# Patient Record
Sex: FEMALE | Race: WHITE | NOT HISPANIC OR LATINO | Employment: UNEMPLOYED | URBAN - METROPOLITAN AREA
[De-identification: names, ages, dates, MRNs, and addresses within clinical notes are randomized per-mention and may not be internally consistent; named-entity substitution may affect disease eponyms.]

---

## 2023-05-30 ENCOUNTER — HOSPITAL ENCOUNTER (EMERGENCY)
Facility: HOSPITAL | Age: 43
Discharge: OTHER INSTITUTION | End: 2023-05-31
Attending: EMERGENCY MEDICINE
Payer: COMMERCIAL

## 2023-05-30 DIAGNOSIS — R11.0 NAUSEA: ICD-10-CM

## 2023-05-30 DIAGNOSIS — R45.851 SUICIDAL IDEATION: Primary | ICD-10-CM

## 2023-05-30 DIAGNOSIS — F11.23 OPIOID DEPENDENCE WITH WITHDRAWAL: ICD-10-CM

## 2023-05-30 LAB
ALBUMIN SERPL-MCNC: 4.7 G/DL (ref 3.5–5.2)
ALBUMIN/GLOB SERPL: 2.4 G/DL
ALP SERPL-CCNC: 74 U/L (ref 39–117)
ALT SERPL W P-5'-P-CCNC: 57 U/L (ref 1–33)
ANION GAP SERPL CALCULATED.3IONS-SCNC: 9.2 MMOL/L (ref 5–15)
AST SERPL-CCNC: 32 U/L (ref 1–32)
BASOPHILS # BLD AUTO: 0.06 10*3/MM3 (ref 0–0.2)
BASOPHILS NFR BLD AUTO: 1 % (ref 0–1.5)
BILIRUB SERPL-MCNC: 0.6 MG/DL (ref 0–1.2)
BUN SERPL-MCNC: 15 MG/DL (ref 6–20)
BUN/CREAT SERPL: 18.3 (ref 7–25)
CALCIUM SPEC-SCNC: 9.7 MG/DL (ref 8.6–10.5)
CHLORIDE SERPL-SCNC: 102 MMOL/L (ref 98–107)
CO2 SERPL-SCNC: 27.8 MMOL/L (ref 22–29)
CREAT SERPL-MCNC: 0.82 MG/DL (ref 0.57–1)
DEPRECATED RDW RBC AUTO: 37.1 FL (ref 37–54)
EGFRCR SERPLBLD CKD-EPI 2021: 92.9 ML/MIN/1.73
EOSINOPHIL # BLD AUTO: 0.07 10*3/MM3 (ref 0–0.4)
EOSINOPHIL NFR BLD AUTO: 1.2 % (ref 0.3–6.2)
ERYTHROCYTE [DISTWIDTH] IN BLOOD BY AUTOMATED COUNT: 12.2 % (ref 12.3–15.4)
ETHANOL BLD-MCNC: <10 MG/DL (ref 0–10)
ETHANOL UR QL: <0.01 %
GLOBULIN UR ELPH-MCNC: 2 GM/DL
GLUCOSE SERPL-MCNC: 87 MG/DL (ref 65–99)
HCG SERPL QL: NEGATIVE
HCT VFR BLD AUTO: 39.4 % (ref 34–46.6)
HGB BLD-MCNC: 13.5 G/DL (ref 12–15.9)
IMM GRANULOCYTES # BLD AUTO: 0.02 10*3/MM3 (ref 0–0.05)
IMM GRANULOCYTES NFR BLD AUTO: 0.3 % (ref 0–0.5)
LYMPHOCYTES # BLD AUTO: 1.52 10*3/MM3 (ref 0.7–3.1)
LYMPHOCYTES NFR BLD AUTO: 26 % (ref 19.6–45.3)
MCH RBC QN AUTO: 28.5 PG (ref 26.6–33)
MCHC RBC AUTO-ENTMCNC: 34.3 G/DL (ref 31.5–35.7)
MCV RBC AUTO: 83.3 FL (ref 79–97)
MONOCYTES # BLD AUTO: 0.3 10*3/MM3 (ref 0.1–0.9)
MONOCYTES NFR BLD AUTO: 5.1 % (ref 5–12)
NEUTROPHILS NFR BLD AUTO: 3.88 10*3/MM3 (ref 1.7–7)
NEUTROPHILS NFR BLD AUTO: 66.4 % (ref 42.7–76)
NRBC BLD AUTO-RTO: 0 /100 WBC (ref 0–0.2)
PLATELET # BLD AUTO: 202 10*3/MM3 (ref 140–450)
PMV BLD AUTO: 10.5 FL (ref 6–12)
POTASSIUM SERPL-SCNC: 4.4 MMOL/L (ref 3.5–5.2)
PROT SERPL-MCNC: 6.7 G/DL (ref 6–8.5)
RBC # BLD AUTO: 4.73 10*6/MM3 (ref 3.77–5.28)
SODIUM SERPL-SCNC: 139 MMOL/L (ref 136–145)
WBC NRBC COR # BLD: 5.85 10*3/MM3 (ref 3.4–10.8)

## 2023-05-30 PROCEDURE — 80307 DRUG TEST PRSMV CHEM ANLYZR: CPT | Performed by: EMERGENCY MEDICINE

## 2023-05-30 PROCEDURE — 80053 COMPREHEN METABOLIC PANEL: CPT | Performed by: EMERGENCY MEDICINE

## 2023-05-30 PROCEDURE — 84703 CHORIONIC GONADOTROPIN ASSAY: CPT | Performed by: EMERGENCY MEDICINE

## 2023-05-30 PROCEDURE — 99284 EMERGENCY DEPT VISIT MOD MDM: CPT

## 2023-05-30 PROCEDURE — 82077 ASSAY SPEC XCP UR&BREATH IA: CPT | Performed by: EMERGENCY MEDICINE

## 2023-05-30 PROCEDURE — 63710000001 ONDANSETRON ODT 4 MG TABLET DISPERSIBLE: Performed by: EMERGENCY MEDICINE

## 2023-05-30 PROCEDURE — 85025 COMPLETE CBC W/AUTO DIFF WBC: CPT | Performed by: EMERGENCY MEDICINE

## 2023-05-30 PROCEDURE — 36415 COLL VENOUS BLD VENIPUNCTURE: CPT

## 2023-05-30 RX ORDER — ONDANSETRON 4 MG/1
4 TABLET, ORALLY DISINTEGRATING ORAL ONCE
Status: COMPLETED | OUTPATIENT
Start: 2023-05-30 | End: 2023-05-30

## 2023-05-30 RX ORDER — CLONIDINE HYDROCHLORIDE 0.1 MG/1
0.1 TABLET ORAL ONCE
Status: COMPLETED | OUTPATIENT
Start: 2023-05-30 | End: 2023-05-30

## 2023-05-30 RX ADMIN — ONDANSETRON 4 MG: 4 TABLET, ORALLY DISINTEGRATING ORAL at 21:25

## 2023-05-30 RX ADMIN — CLONIDINE HYDROCHLORIDE 0.1 MG: 0.1 TABLET ORAL at 21:24

## 2023-05-30 NOTE — ED TRIAGE NOTES
Pt states that she is detoxing from opiates. Reports that she last used 24 hours ago. States that she feels dehydrated and light headed.

## 2023-05-31 VITALS
HEIGHT: 64 IN | WEIGHT: 150 LBS | OXYGEN SATURATION: 92 % | DIASTOLIC BLOOD PRESSURE: 66 MMHG | HEART RATE: 75 BPM | TEMPERATURE: 97.8 F | RESPIRATION RATE: 18 BRPM | SYSTOLIC BLOOD PRESSURE: 105 MMHG | BODY MASS INDEX: 25.61 KG/M2

## 2023-05-31 LAB
AMPHET+METHAMPHET UR QL: POSITIVE
BARBITURATES UR QL SCN: NEGATIVE
BENZODIAZ UR QL SCN: NEGATIVE
CANNABINOIDS SERPL QL: NEGATIVE
COCAINE UR QL: NEGATIVE
FENTANYL UR-MCNC: NEGATIVE NG/ML
METHADONE UR QL SCN: NEGATIVE
OPIATES UR QL: NEGATIVE
OXYCODONE UR QL SCN: NEGATIVE
SARS-COV-2 RNA RESP QL NAA+PROBE: NOT DETECTED

## 2023-05-31 PROCEDURE — 25010000002 DICYCLOMINE PER 20 MG: Performed by: PHYSICIAN ASSISTANT

## 2023-05-31 PROCEDURE — 96372 THER/PROPH/DIAG INJ SC/IM: CPT

## 2023-05-31 PROCEDURE — 87635 SARS-COV-2 COVID-19 AMP PRB: CPT | Performed by: PHYSICIAN ASSISTANT

## 2023-05-31 PROCEDURE — 90791 PSYCH DIAGNOSTIC EVALUATION: CPT

## 2023-05-31 RX ORDER — ONDANSETRON 4 MG/1
4 TABLET, ORALLY DISINTEGRATING ORAL 4 TIMES DAILY PRN
Qty: 12 TABLET | Refills: 0 | Status: SHIPPED | OUTPATIENT
Start: 2023-05-31

## 2023-05-31 RX ORDER — DICYCLOMINE HYDROCHLORIDE 10 MG/ML
20 INJECTION INTRAMUSCULAR ONCE
Status: COMPLETED | OUTPATIENT
Start: 2023-05-31 | End: 2023-05-31

## 2023-05-31 RX ADMIN — DICYCLOMINE HYDROCHLORIDE 20 MG: 20 INJECTION INTRAMUSCULAR at 02:14

## 2023-05-31 NOTE — ED PROVIDER NOTES
0009: Care assumed from Dr. Chamorro pending [facility] evaluation.    0135: [facility] has seen and evaluated the patient.  They are planning to transfer her to an outlying facility.  COVID test ordered.    0150: Recheck of patient.  She is resting quietly.  She understands plan for transfer.    0530: Patient will be transferred to [facility] at 9:00.  EMTALA form filled out.     Jorge Moyer PA  05/31/23 0546

## 2023-05-31 NOTE — CASE MANAGEMENT/SOCIAL WORK
Called [facility] and spoke with Nataly. They also have beds so I have faxed a patient packet to them as well.

## 2023-05-31 NOTE — ED PROVIDER NOTES
EMERGENCY DEPARTMENT ENCOUNTER    Room Number:  09/09  Date of encounter:  5/31/2023  PCP: Darinel Spicer APRN  Patient Care Team:  Darinel Spicer APRN as PCP - General (Nurse Practitioner)   Independent Historians: Patient    HPI:  Chief Complaint: Opiate withdrawal    A complete HPI/ROS/PMH/PSH/SH/FH are unobtainable due to: Nothing    Chronic or social conditions impacting patient care (Social Determinants of Health): None  (Financial Resource Strain / Food Insecurity / Transportation Needs / Physical Activity / Stress / Social Connections / Intimate Partner Violence / Housing Stability)    Context: Marleni rGegg is a 42 y.o. female who presents to the ED c/o acute opiate withdrawal.  She reports her last use of opiates was 24 hours ago.  She reports that she snorts opiates.  She states she does not inject.  She states that she got tired of living that way and decided 24 hours ago to stop using opiates.  She states she has been going through withdrawal.  She states she has nausea, pain all over, headache, vomiting.  She has not had any medicine to help with her symptoms.  She reports she is beginning to feel suicidal.  She denies any attempt to harm herself.  She states that in the past she has attempted to harm herself with prior suicidal feelings.  Nothing makes her symptoms worse or better.  She does states she has a stable living environment.  She denies abdominal pain.  She denies diarrhea.  She denies fevers.    Review of prior external notes (non-ED) -and- Review of prior external test results outside of this encounter: Chest x-ray dated 11/19/2022 was clear    Prescription drug monitoring program review:         PAST MEDICAL HISTORY  Active Ambulatory Problems     Diagnosis Date Noted   • No Active Ambulatory Problems     Resolved Ambulatory Problems     Diagnosis Date Noted   • No Resolved Ambulatory Problems     No Additional Past Medical History         PAST SURGICAL HISTORY  History reviewed.  No pertinent surgical history.      FAMILY HISTORY  History reviewed. No pertinent family history.      SOCIAL HISTORY  Social History     Socioeconomic History   • Marital status:          ALLERGIES  Patient has no known allergies.        REVIEW OF SYSTEMS  Review of Systems  Included in HPI  All systems reviewed and negative except for those discussed in HPI.      PHYSICAL EXAM    I have reviewed the triage vital signs and nursing notes.    ED Triage Vitals   Temp Heart Rate Resp BP SpO2   05/30/23 1901 05/30/23 1901 05/30/23 1901 05/30/23 1902 05/30/23 1901   98.9 °F (37.2 °C) 100 18 136/94 100 %      Temp src Heart Rate Source Patient Position BP Location FiO2 (%)   05/30/23 1901 05/30/23 1901 05/30/23 1902 -- --   Tympanic Monitor Standing         Physical Exam  GENERAL: Awake, alert, no acute distress  SKIN: Warm, dry  HENT: Normocephalic, atraumatic  EYES: no scleral icterus  CV: regular rhythm, regular rate  RESPIRATORY: normal effort, lungs clear  ABDOMEN: soft, nontender, nondistended  MUSCULOSKELETAL: no deformity  NEURO: alert, moves all extremities, follows commands                                                               LAB RESULTS  Recent Results (from the past 24 hour(s))   COVID-19,[facility] IN-HOUSE CEPHEID/HUMAIRA NP SWAB IN TRANSPORT MEDIA 8-12 HR TAT - Swab, Nasopharynx    Collection Time: 05/31/23  1:53 AM    Specimen: Nasopharynx; Swab   Result Value Ref Range    COVID19 Not Detected Not Detected - Ref. Range       Ordered the above labs and independently reviewed the results.        RADIOLOGY  No Radiology Exams Resulted Within Past 24 Hours    I ordered the above noted radiological studies. Reviewed by me and discussed with radiologist.  See dictation for official radiology interpretation.      PROCEDURES    Procedures      MEDICATIONS GIVEN IN ER    Medications   ondansetron ODT (ZOFRAN-ODT) disintegrating tablet 4 mg (4 mg Oral Given 5/30/23 2125)   cloNIDine (CATAPRES) tablet 0.1  mg (0.1 mg Oral Given 5/30/23 2124)   dicyclomine (BENTYL) injection 20 mg (20 mg Intramuscular Given 5/31/23 0214)         ORDERS PLACED DURING THIS VISIT:  Orders Placed This Encounter   Procedures   • COVID PRE-OP / PRE-PROCEDURE SCREENING ORDER (NO ISOLATION) - Swab, Nasopharynx   • COVID-19,[facility] IN-HOUSE CEPHEID/HUMAIRA NP SWAB IN TRANSPORT MEDIA 8-12 HR TAT - Swab, Nasopharynx   • Comprehensive Metabolic Panel   • hCG, Serum, Qualitative   • CBC Auto Differential   • Urine Drug Screen - Urine, Clean Catch   • Ethanol   • Psych / [facility]   • CBC & Differential         PROGRESS, DATA ANALYSIS, CONSULTS, AND MEDICAL DECISION MAKING    All labs have been independently interpreted by me.  All radiology studies have been reviewed by me and discussed with radiologist dictating the report.   EKG's independently viewed and interpreted by me.  Discussion below represents my analysis of pertinent findings related to patient's condition, differential diagnosis, treatment plan and final disposition.    Differential diagnosis includes but is not limited to withdrawal, substance use, intoxication, dehydration.    ED Course as of 05/31/23 2331   Wed May 31, 2023   0006 Care transferred to Jorge Moyer PA-C pending psychiatry evaluation, reevaluation and disposition. [TR]   0556 Patient to be transferred to [facility] at 9 AM. [EE]      ED Course User Index  [EE] Jorge Moyer PA  [TR] Lj Chamorro MD                 AS OF 23:31 EDT VITALS:    BP - 105/66  HR - 75  TEMP - 97.8 °F (36.6 °C) (Oral)  O2 SATS - 92%        DIAGNOSIS  Final diagnoses:   Suicidal ideation   Opioid dependence with withdrawal   Nausea         DISPOSITION  ED Disposition     ED Disposition   DC/Transfer to Behavioral Health    Condition   Stable    Comment   --                Note Disclaimer: At [facility], we believe that sharing information builds trust and better relationships. You are receiving this note because you recently  visited [facility]. It is possible you will see health information before a provider has talked with you about it. This kind of information can be easy to misunderstand. To help you fully understand what it means for your health, we urge you to discuss this note with your provider.       Lj Chamorro MD  05/31/23 0010       Lj Chamorro MD  05/31/23 9840

## 2023-05-31 NOTE — CASE MANAGEMENT/SOCIAL WORK
Called [facility] and spoke with Selwyn. They do have beds.  I have faxed   A patient packet for their review.

## 2023-05-31 NOTE — NURSING NOTE
Pt was accepted under Dr. Tay at [facility]. Pt to be transferred to [facility], room 186, bed 2. Called report to Vilma LUEVANO at [facility]. Vilma requests that we let them know when to expect pt arrival. Requested that the ED RN let the AC know when transport is available. RN portion of HOAWRD completed.

## 2023-05-31 NOTE — CONSULTS
"[facility] consult for 42 year old female seen in ED bed 9 for opiate withdrawal and SI. Pt lives at home with her  and 3 adult children. Pt's UDS positive for amphetamines. BAL negative. Pt does not work.     Pt alert and oriented x4 during assessment, but was visibly ill appearing and sweaty/pale. Pt states she is detoxing from heroin and her last usage was over 24 hours ago. Pt states she was attempting to detox at home, but that she could not continue due to her symptoms of N/V, all over body pain, HA, and stomach ache. Pt also states that she was having some \"scary thoughts\" over the last couple of hours. Pt states she had thought about overdosing on her medications so that she would not have to endure the pain of detox any longer. Pt states she does have a hx of multiple suicide attempts in the past, all of which were done while she was detoxing. Pt states her last attempt was over two years ago, she \"slit my throat\" while she was detoxing from Suboxone and was taken to [facility] and admitted for \"about a week\". Pt states she has had other attempts in the past but could not recall details. Pt states she has been to multiple detox centers in the past as well, but could not recall specific places. Pt states her longest period of sobriety was following her stay at [facility] and she was clean for \"almost a year\". Pt states she was going to a Suboxone clinic during that time and was not using. Pt states she \"fell off the wagon\", and her  encouraged her to come to the ED tonight for help.     Pt is a daily heroin user and states uses a high dose, an \"8 bar\". Pt states she has been doing this daily for at least a year. Pt states she has used drugs on and off for most of her life. Pt states she is wanting help to detox and wanting to go to [facility] inpatient program, but that she also cannot promise to keep herself safe. Pt states \"I think this is the worst detox I've been through, and it's only been a day, " "I'm afraid of what I might do to myself\". Pt states she has thoughts about overdosing on pills. Pt states she is prescribed 0.1 mg Risperdal through her PCP, and that she is a \"manic depressive\". Denies any other diagnosis or psychiatric medications. Denies any current psychiatric providers, but states she is wanting to reestablish care with a psychiatrist. Pt states the last time she saw a psychiatrist was over 2 years ago when she was admitted to [facility].     As the pt cannot promise safety outside of the hospital, have discussed need for an inpatient bed with ED physician. Pt with 1-1 sitter in the ED while awaiting bed. Pt with no medication allergies. Allergy to latex. No diet or food restrictions. Pt has no mobility limitations and does not require any medical assistive devices at home. Access will search for a bed.   "

## 2025-07-30 ENCOUNTER — TELEMEDICINE (OUTPATIENT)
Dept: FAMILY MEDICINE CLINIC | Facility: TELEHEALTH | Age: 45
End: 2025-07-30
Payer: COMMERCIAL

## 2025-07-30 DIAGNOSIS — B86 SCABIES: Primary | ICD-10-CM

## 2025-07-30 DIAGNOSIS — B85.2 LICE: ICD-10-CM

## 2025-07-30 PROCEDURE — 1159F MED LIST DOCD IN RCRD: CPT | Performed by: NURSE PRACTITIONER

## 2025-07-30 PROCEDURE — 99203 OFFICE O/P NEW LOW 30 MIN: CPT | Performed by: NURSE PRACTITIONER

## 2025-07-30 PROCEDURE — 1160F RVW MEDS BY RX/DR IN RCRD: CPT | Performed by: NURSE PRACTITIONER

## 2025-07-30 RX ORDER — DULOXETIN HYDROCHLORIDE 30 MG/1
1 CAPSULE, DELAYED RELEASE ORAL DAILY
COMMUNITY
Start: 2025-07-02

## 2025-07-30 RX ORDER — PERMETHRIN 50 MG/G
1 CREAM TOPICAL ONCE
Qty: 1 G | Refills: 1 | Status: SHIPPED | OUTPATIENT
Start: 2025-07-30 | End: 2025-07-30

## 2025-07-30 RX ORDER — CETIRIZINE HYDROCHLORIDE 10 MG/1
TABLET ORAL
COMMUNITY
Start: 2025-05-03

## 2025-07-30 RX ORDER — ALBUTEROL SULFATE 90 UG/1
AEROSOL, METERED RESPIRATORY (INHALATION)
COMMUNITY
Start: 2025-04-26

## 2025-07-30 RX ORDER — BENZOCAINE/MENTHOL 6 MG-10 MG
LOZENGE MUCOUS MEMBRANE
COMMUNITY
Start: 2025-04-26

## 2025-07-30 RX ORDER — HYDROXYZINE HYDROCHLORIDE 25 MG/1
25 TABLET, FILM COATED ORAL 3 TIMES DAILY PRN
Qty: 21 TABLET | Refills: 0 | Status: SHIPPED | OUTPATIENT
Start: 2025-07-30

## 2025-07-30 RX ORDER — OMEPRAZOLE 40 MG/1
CAPSULE, DELAYED RELEASE ORAL
COMMUNITY
Start: 2025-04-20

## 2025-07-30 RX ORDER — MULTIVITAMIN
1 TABLET ORAL DAILY
COMMUNITY
Start: 2025-06-01

## 2025-07-30 RX ORDER — BUPRENORPHINE HYDROCHLORIDE, NALOXONE HYDROCHLORIDE 8; 2 MG/1; MG/1
FILM, SOLUBLE BUCCAL; SUBLINGUAL
COMMUNITY

## 2025-07-30 NOTE — PROGRESS NOTES
Subjective   Chief Complaint   Patient presents with    Rash       Marleni Gregg is a 44 y.o. female.     History of Present Illness  Patient presents requesting treatment for scabies.  Her  currently has scabies and was started on permethrin cream yesterday.  She states she has a rash all over her body but worse under her breasts.  The rash is red and bumpy but she also has red patches as well. Rash is very pruritic, she feels like her skin is sore from scratching.  She is also experiencing pruritus of the scalp, hair and inside of the ears.  She has a history of scabies and chronic pruritus.  She is not sure if previous treatments have worked and feels like this is becoming a chronic problem.  She has tried to get in with dermatology but cannot find one to accept her insurance without a year-long wait list.  Rash  Chronicity:  Recurrent  Onset:  In the past 7 days  Progression since onset:  Unchanged  Affected locations:  Diffuse  Characteristics:  Redness, itchiness and pain  Associated with: scabies.  Associated symptoms: no anorexia, no congestion, no cough, no diarrhea, no pain, no facial edema, no fatigue, no fever, no joint pain, no nail changes, no rhinorrhea, no shortness of breath, no sore throat and no vomiting         No Known Allergies    History reviewed. No pertinent past medical history.    History reviewed. No pertinent surgical history.    Social History     Socioeconomic History    Marital status:        History reviewed. No pertinent family history.      Current Outpatient Medications:     cetirizine (zyrTEC) 10 MG tablet, , Disp: , Rfl:     DULoxetine (CYMBALTA) 30 MG capsule, Take 1 capsule by mouth Daily., Disp: , Rfl:     hydrocortisone 1 % cream, , Disp: , Rfl:     Multivitamin tablet tablet, Take 1 tablet by mouth Daily., Disp: , Rfl:     omeprazole (priLOSEC) 40 MG capsule, take 1 capsule by mouth daily 30 minutes before breakfast, Disp: , Rfl:     Ventolin  (90 Base)  MCG/ACT inhaler, , Disp: , Rfl:     vitamin D3 125 MCG (5000 UT) capsule capsule, Take 1 capsule by mouth Daily., Disp: , Rfl:     hydrOXYzine (ATARAX) 25 MG tablet, Take 1 tablet by mouth 3 (Three) Times a Day As Needed for Itching or Anxiety., Disp: 21 tablet, Rfl: 0    permethrin (ELIMITE) 5 % cream, Apply 1 Application topically to the appropriate area as directed 1 (One) Time for 1 dose. May repeat in 1 week if needed, Disp: 1 g, Rfl: 1    permethrin (NIX) 1 % liquid, Apply  topically to the appropriate area as directed 1 (One) Time for 1 dose. Apply to hair as directed. May repeat in 1 week if needed, Disp: 59 mL, Rfl: 1    Suboxone 8-2 MG film film, , Disp: , Rfl:       Review of Systems   Constitutional:  Negative for chills, diaphoresis, fatigue and fever.   HENT:  Negative for congestion, rhinorrhea and sore throat.    Eyes:  Negative for pain.   Respiratory:  Negative for cough and shortness of breath.    Gastrointestinal:  Negative for anorexia, diarrhea and vomiting.   Musculoskeletal:  Negative for joint pain.   Skin:  Positive for rash. Negative for nail changes.        There were no vitals filed for this visit.    Objective   Physical Exam  Constitutional:       General: She is not in acute distress.     Appearance: Normal appearance. She is not ill-appearing, toxic-appearing or diaphoretic.   HENT:      Head: Normocephalic.      Mouth/Throat:      Lips: Pink.      Mouth: Mucous membranes are moist.   Pulmonary:      Effort: Pulmonary effort is normal.   Skin:     Findings: Erythema and rash present. Rash is macular and papular.   Neurological:      Mental Status: She is alert and oriented to person, place, and time.          Procedures     Assessment & Plan   Diagnoses and all orders for this visit:    1. Scabies (Primary)  -     hydrOXYzine (ATARAX) 25 MG tablet; Take 1 tablet by mouth 3 (Three) Times a Day As Needed for Itching or Anxiety.  Dispense: 21 tablet; Refill: 0  -     permethrin  (ELIMITE) 5 % cream; Apply 1 Application topically to the appropriate area as directed 1 (One) Time for 1 dose. May repeat in 1 week if needed  Dispense: 1 g; Refill: 1    2. Lice  -     hydrOXYzine (ATARAX) 25 MG tablet; Take 1 tablet by mouth 3 (Three) Times a Day As Needed for Itching or Anxiety.  Dispense: 21 tablet; Refill: 0  -     permethrin (NIX) 1 % liquid; Apply  topically to the appropriate area as directed 1 (One) Time for 1 dose. Apply to hair as directed. May repeat in 1 week if needed  Dispense: 59 mL; Refill: 1      If symptoms continue or do not resolve, recommend follow-up with dermatology.      PLAN: Discussed dosing, side effects, recommended other symptomatic care.  Patient should follow up with primary care provider, Urgent Care or ER if symptoms worsen, fail to resolve or other symptoms need attention. Patient/family agree to the above.         ESTEBAN Acuña     Mode of Visit: Video  Location of patient: -HOME-  Location of provider: +HOME+  You have chosen to receive care through a telehealth visit.  The patient has signed the video visit consent form.  The visit included audio and video interaction. No technical issues occurred during this visit.    The use of a video visit has been reviewed with the patient and verbal informed consent has been obtained. Myself and Marleni Gregg participated in this visit. The patient is located at [address] [place] [address]. I am located in [place], [place]. Mychart and Zoom were utilized.        This visit was performed via Telehealth.  This patient has been instructed to follow-up with their primary care provider if their symptoms worsen or the treatment provided does not resolve their illness.